# Patient Record
(demographics unavailable — no encounter records)

---

## 2024-12-09 NOTE — CONSULT LETTER
[Dear  ___] : Dear  [unfilled], [Consult Letter:] : I had the pleasure of evaluating your patient, [unfilled]. [Please see my note below.] : Please see my note below. [Consult Closing:] : Thank you very much for allowing me to participate in the care of this patient.  If you have any questions, please do not hesitate to contact me. [Sincerely,] : Sincerely, [DrSukhdeep  ___] : Dr. BHATT [DrSukhdeep ___] : Dr. BHATT [FreeTextEntry3] :  Robbin Gaspar M.D., FACS, [facility] Director [facility] at [facility] Certified in Thyroid/Parathyroid/Neck Ultrasound, RE/ VÍCTOR , Department of Otolaryngology [facility] at [facility]

## 2024-12-09 NOTE — HISTORY OF PRESENT ILLNESS
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  The left lobe contained a lower pole isoechoic nodule with microcalcifications measuring 1.4 x 1.1 x 1.3 cm TI-RADS TR 4.  Within the left lower pole of the left lobe there is a hypoechoic nodule with microcalcifications measuring 7 x 7 x 6 mm TI-RADS TR 5.  The larger nodule was then biopsied because of a family history of thyroid cancer (her mother had papillary thyroid cancer).  The cytology was reported as atypia of undetermined significance [place] category III, AUS with nuclear atypia.  However, on the ThyroSeq molecular panel a BRAF V600E mutation was identified with a 99% risk that this represents a papillary thyroid carcinoma and surgical consultation recommended.   Megan denies recent shortness of breath, voice changes, dysphagia, anterior neck pain, neck pressure or mass. She denies any known radiation exposures in her youth. She is euthyroid.

## 2024-12-09 NOTE — REASON FOR VISIT
[FreeTextEntry2] : a surgical consultation concerning multiple thyroid nodules and FH of thyroid cancer. [FreeTextEntry1] : Referred by Endocrinologis Cathie Stratton MD  PCP is Jeny Stoner MD

## 2024-12-09 NOTE — CONSULT LETTER
[Dear  ___] : Dear  [unfilled], [Consult Letter:] : I had the pleasure of evaluating your patient, [unfilled]. [Please see my note below.] : Please see my note below. [Consult Closing:] : Thank you very much for allowing me to participate in the care of this patient.  If you have any questions, please do not hesitate to contact me. [Sincerely,] : Sincerely, [DrSukhdeep  ___] : Dr. BHATT [DrSukhdeep ___] : Dr. BHATT [FreeTextEntry3] :  Robbin Gaspar M.D., FACS, [facility] Director [facility] at [facility] Certified in Thyroid/Parathyroid/Neck Ultrasound, RE/ VÍCTOR , Department of Otolaryngology [facility] at [facility]

## 2024-12-09 NOTE — DATA REVIEWED
[de-identified] : see HPI  [de-identified] : see HPI  [de-identified] : Cytology report as well as molecular test results reviewed

## 2024-12-09 NOTE — PHYSICAL EXAM
[TextEntry] : Focused Head and Neck Examination:  General Appearance: The patient has a normal appearance (head and face), able to communicate with normal speech and is a well-groomed, well-developed, well-nourished, thin fit female in no apparent distress.  Breathing was silent and not labored. The patient was alert had normal affect and oriented to person, place, and time. The patient had normal facial and neck skin with normal facial symmetry, strength and motion, no visible/ palpable facial masses or sinus tenderness.  Otologic Exam: The pinnae and bilateral external ear canals were without lesions and clear.  The tympanic membranes were normal bilaterally without perforation and demonstrated normal mobility. There was no evidence of middle ear effusion or infection. Hearing is grossly normal to finger rub.   External Nasal Exam: The external nose was normal in appearance without lesions or deformity.    Intranasal Exam: There were no mucosal lesions, discolorations, nasal polyps, or masses. The nasal septum was intact without perforations. The nasal septum was deviated slightly to the left with a spur. The inferior turbinates were normal. The middle turbinates were normal bilaterally.  The middle meatus was clear, and the secretions were thick but not purulent.  Nasopharynx: There were no visible masses, mucosal ulcerations, or other lesions.  Secretions were thick but not purulent.  Oral cavity: The patient's lips and vermillion border were normal without lesions, ulcerations or discolorations. Dentition was normal, the gums were normal, the oral mucosa was normal and there were no lesions, discolorations, ulcerations, erythema, or leukoplakia.  The floor of mouth was without lesions or palpable calculi. The oral tongue has normal mobility, without palpable or visible lesions, ulcerations, nodularity or tenderness.  All surfaces including lateral, ventral and dorsal mucosa examined and palpated. Expressed salivary flow was normal.  Stensens ducts are without palpable calculi.    Oropharynx: The tongue base was without palpable lesions, the soft palate was normal without lesions, the hard palate was normal without lesions, ulcerations, nodularity or discolorations.  The palatine tonsils were normal, and the lingual tonsils were normal.  Pharynx: The lateral and posterior pharyngeal walls were intact without mucosal lesions, discolorations, ulcerations, or masses.    Larynx and Hypopharynx: Flexible fiber optic laryngoscopy was performed with topical anesthesia using 4% lidocaine and 0.5 % Oxymetazoline on cotton pledgets. More details of the exam are outlined in my procedure note but this examination revealed normal, symmetric vocal fold mobility and normal mucosa without vocal fold lesions, discolorations, or ulcerations.  There was slight posterior pachydermia. The epiglottis and false vocal folds were normal without mucosal lesions. The piriform sinuses opened well and there were no lesions or pooling of saliva. The trachea was clear without narrowing in the immediate subglottic area and in midline position without lesions.    Neck Exam: There was no palpable lymphadenopathy or bruits in the central or lateral davis drainage basins levels I-VI.  There was no asymmetry, pulsatile masses or nodules. The parotid and submandibular glands were not enlarged or tender and without palpable nodules or mass.  The temporomandibular joints were normal bilaterally without deviation/subluxation/click/tenderness or crepitus to palpation.    Thyroid Examination: Thyroid gland was not enlarged nontender and with a large palpable nodule in the left mid to lower lobe that is mobile.  Neurological Exam: Cranial nerves II through XII were intact.  There was no visible tremor. Gross motor and sensory functions are normal.  A Chvostek sign was negative.  Ocular Exam: Pupils were equal and responsive to light.  Extraocular movements and gaze were normal. The sclera and conjunctiva were normal and anicteric.  Nystagmus was absent. No sign of erythema, ulcerations or lesions.    Respiratory: Respiratory effort is normal with symmetric chest expansion and no retractions or use of accessory muscles.    Cardiovascular: Extremities are normal with strong pulses, normal temperature, and no edema.

## 2024-12-09 NOTE — REASON FOR VISIT
[FreeTextEntry2] : a surgical consultation concerning multiple thyroid nodules and FH of thyroid cancer. [FreeTextEntry1] : Referred by Endocrinologis Cathei Stratton MD  PCP is Jeny Stoner MD

## 2024-12-09 NOTE — PROCEDURE
[Image(s) Captured] : image(s) captured and filed [Unable to Cooperate with Mirror] : patient unable to cooperate with mirror [Gag Reflex] : gag reflex preventing mirror examination [Topical Lidocaine] : topical lidocaine [Oxymetazoline HCl] : oxymetazoline HCl [Flexible Endoscope] : examined with the flexible endoscope [Serial Number: ___] : Serial Number: [unfilled] [FreeTextEntry3] : [facility] THYROID/[facility]ECK ULTRASOUND REPORT  NAME: WILSON CONNELL..           MR# [id number].....	              : 1989.....	         DATE: 2024.  HISTORY/ INDICATIONS: 34-year-old female with 2 solid nodules within the left thyroid lobe, the larger nodule is suspicious for papillary thyroid carcinoma based on molecular testing positive for a BRAF V600 E mutation.  This current exam is for lymph node mapping and treatment planning.  COMPARISON: None.  PROCEDURE: Physician performed high-resolution ultrasound gray scale imaging and color Doppler supplementation of the thyroid gland and neck was obtained in the longitudinal and transverse planes using a 13 MHz linear transducer with image capture.  All measurements are in centimeters (longitudinal x AP x transverse).    FINDINGS: Overall, the thyroid gland is normal in size, heterogeneous in echotexture with normal vascularity on color Doppler flow.  There are 2 solid nodules within the left mid lower and lower pole region of the left lobe.  Both nodules are suspicious for papillary thyroid carcinoma.  There is no evidence of extrathyroidal extension or lymph node metastases.  RIGHT LOBE: Is not enlarged, heterogeneous, with normal vascularity on color Doppler and measures 4.58 x 1.13 x 1.80 cm.  NODULES: Within the mid to upper right lobe, a hypoechoic, heterogeneous, well-marginated, solid nodule with grade 1 vascularity is identified within echogenic colloid crystals and measures 4 mm in greatest dimension consistent with a colloid cyst.   ISTHMUS: Measures 0.26 cm in AP dimension and is heterogeneous in echotexture with normal vascularity.  No nodules are identified.  LEFT LOBE: Is not enlarged, heterogeneous, with normal vascularity on color Doppler and measures 5.20 x 1.33 x 2.19 cm. NODULES: There are 2 solid nodules within the left thyroid lobe.  #1: A left mid lower lobe isoechoic solid nodule with smooth margins and a hypoechoic halo, is wider than tall, heterogeneous in echotexture with numerous punctate echogenic foci that measures 1.63 x 1.12 x 1.51 cm, TI-RADS TR 4.  #2.  Within the left lower lobe there is a solid hypoechoic nodule with smooth margins, that is wider than tall with numerous punctate echogenic foci and a grade 2 vascularity and measures 0.77 x 0.62 x 0.80 cm TI-RADS TR 5.    PARATHYROID GLANDS: There are no identified enlarged parathyroid glands in the central neck compartment.   LYMPH NODES: Bilateral neck levels I - VI were examined.  There are several benign appearing sub centimeter lymph nodes identified at neck levels II- III bilaterally (lateral neck), all with echogenic hilar lines, no calcifications or cystic degeneration and have a short, long axis ratio < 0.5 in the transverse plane.  There are no enlarged or abnormal appearing central compartment, level VI lymph nodes.  IMPRESSION: A 34-year-old female with a multinodular thyroid gland with 2 dominant nodules within the left thyroid lobe both suspicious for papillary thyroid carcinoma.  There is no evidence of extrathyroidal extension or lymph node metastases.  There is a tiny colloid cyst in the right mid to upper lobe posteriorly.    RECOMMENDATIONS: If active surveillance is chosen over thyroidectomy, repeat thyroid ultrasound 6 months as well as continued monitoring of TSH.  A CT scan of the neck is a consideration to assess possible additional lymph node involvement or extrathyroidal extension of tumor not seen on ultrasound.  Electronically signed by referring, interpreting and reporting physician Robbin Gaspar MD on 2024, 3:20 PM.  [facility] [address] [place] [address], [address], [place] [address] [phone] (voice), [phone] (fax). -------------------------------------------------------------------------------------------------------------------------------------------------------------------------------------    [de-identified] : Verbal informed consent was obtained from the patient.  Findings: The nasal septum is minimally deviated to the left with a spur.  There are no masses or polyps, and the nasal mucosa and secretions are normal. The choanae and posterior nasopharynx are normal without masses or drainage. The Eustachian tube orifices appear patent. The pharynx, including the posterior and lateral pharyngeal walls, the vallecula and base of tongue are normal without ulcerations, lesions or masses. The hypopharynx including the pyriform sinuses open well without pooling of secretions, mucosal lesions or masses. The supraglottic larynx including the epiglottis, petiole, arytenoids, glossoepiglottic, aryepiglottic and pharyngoepiglottic folds are normal without mucosal lesions, ulcerations or masses. The glottis reveals normal false vocal folds. The true vocal folds are glistening white, tense and of equal length, without paralysis, having symmetric mobility on adduction and abduction. There are no mucosal lesions, nodules, cysts, erythroplasia or leukoplakia. The posterior cricoid area has healthy pink mucosa in the interarytenoid area and esophageal inlet. There is [no] thickening/pachydermia of the interarytenoid mucosa suggestive of posterior laryngitis from laryngopharyngeal acid reflux disease. The trachea is clear without narrowing in the immediate subglottic region, without deviation or lesions. ------------------------------------------------------------------------------------------- [de-identified] : prop papillary thyroid carcinoma

## 2024-12-09 NOTE — HISTORY OF PRESENT ILLNESS
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  The left lobe contained a lower pole isoechoic nodule with microcalcifications measuring 1.4 x 1.1 x 1.3 cm TI-RADS TR 4.  Within the left lower pole of the left lobe there is a hypoechoic nodule with microcalcifications measuring 7 x 7 x 6 mm TI-RADS TR 5.  The larger nodule was then biopsied because of a family history of thyroid cancer (her mother had papillary thyroid cancer).  The cytology was reported as atypia of undetermined significance [place] category III, AUS with nuclear atypia.  However, on the ThyroSeq molecular panel a BRAF V600E mutation was identified with a 99% risk that this represents a papillary thyroid carcinoma and surgical consultation recommended.   Megan denies recent shortness of breath, voice changes, dysphagia, anterior neck pain, neck pressure or mass. She denies any known radiation exposures in her youth. She is euthyroid.

## 2024-12-09 NOTE — PROCEDURE
[Image(s) Captured] : image(s) captured and filed [Unable to Cooperate with Mirror] : patient unable to cooperate with mirror [Gag Reflex] : gag reflex preventing mirror examination [Topical Lidocaine] : topical lidocaine [Oxymetazoline HCl] : oxymetazoline HCl [Flexible Endoscope] : examined with the flexible endoscope [Serial Number: ___] : Serial Number: [unfilled] [FreeTextEntry3] : [facility] THYROID/[facility]ECK ULTRASOUND REPORT  NAME: WILSON CONNELL..           MR# [id number].....	              : 1989.....	         DATE: 2024.  HISTORY/ INDICATIONS: 34-year-old female with 2 solid nodules within the left thyroid lobe, the larger nodule is suspicious for papillary thyroid carcinoma based on molecular testing positive for a BRAF V600 E mutation.  This current exam is for lymph node mapping and treatment planning.  COMPARISON: None.  PROCEDURE: Physician performed high-resolution ultrasound gray scale imaging and color Doppler supplementation of the thyroid gland and neck was obtained in the longitudinal and transverse planes using a 13 MHz linear transducer with image capture.  All measurements are in centimeters (longitudinal x AP x transverse).    FINDINGS: Overall, the thyroid gland is normal in size, heterogeneous in echotexture with normal vascularity on color Doppler flow.  There are 2 solid nodules within the left mid lower and lower pole region of the left lobe.  Both nodules are suspicious for papillary thyroid carcinoma.  There is no evidence of extrathyroidal extension or lymph node metastases.  RIGHT LOBE: Is not enlarged, heterogeneous, with normal vascularity on color Doppler and measures 4.58 x 1.13 x 1.80 cm.  NODULES: Within the mid to upper right lobe, a hypoechoic, heterogeneous, well-marginated, solid nodule with grade 1 vascularity is identified within echogenic colloid crystals and measures 4 mm in greatest dimension consistent with a colloid cyst.   ISTHMUS: Measures 0.26 cm in AP dimension and is heterogeneous in echotexture with normal vascularity.  No nodules are identified.  LEFT LOBE: Is not enlarged, heterogeneous, with normal vascularity on color Doppler and measures 5.20 x 1.33 x 2.19 cm. NODULES: There are 2 solid nodules within the left thyroid lobe.  #1: A left mid lower lobe isoechoic solid nodule with smooth margins and a hypoechoic halo, is wider than tall, heterogeneous in echotexture with numerous punctate echogenic foci that measures 1.63 x 1.12 x 1.51 cm, TI-RADS TR 4.  #2.  Within the left lower lobe there is a solid hypoechoic nodule with smooth margins, that is wider than tall with numerous punctate echogenic foci and a grade 2 vascularity and measures 0.77 x 0.62 x 0.80 cm TI-RADS TR 5.    PARATHYROID GLANDS: There are no identified enlarged parathyroid glands in the central neck compartment.   LYMPH NODES: Bilateral neck levels I - VI were examined.  There are several benign appearing sub centimeter lymph nodes identified at neck levels II- III bilaterally (lateral neck), all with echogenic hilar lines, no calcifications or cystic degeneration and have a short, long axis ratio < 0.5 in the transverse plane.  There are no enlarged or abnormal appearing central compartment, level VI lymph nodes.  IMPRESSION: A 34-year-old female with a multinodular thyroid gland with 2 dominant nodules within the left thyroid lobe both suspicious for papillary thyroid carcinoma.  There is no evidence of extrathyroidal extension or lymph node metastases.  There is a tiny colloid cyst in the right mid to upper lobe posteriorly.    RECOMMENDATIONS: If active surveillance is chosen over thyroidectomy, repeat thyroid ultrasound 6 months as well as continued monitoring of TSH.  A CT scan of the neck is a consideration to assess possible additional lymph node involvement or extrathyroidal extension of tumor not seen on ultrasound.  Electronically signed by referring, interpreting and reporting physician Robbin Gaspar MD on 2024, 3:20 PM.  [facility] [address] [address], [address], [place] [address] [phone] (voice), [phone] (fax). -------------------------------------------------------------------------------------------------------------------------------------------------------------------------------------    [de-identified] : Verbal informed consent was obtained from the patient.  Findings: The nasal septum is minimally deviated to the left with a spur.  There are no masses or polyps, and the nasal mucosa and secretions are normal. The choanae and posterior nasopharynx are normal without masses or drainage. The Eustachian tube orifices appear patent. The pharynx, including the posterior and lateral pharyngeal walls, the vallecula and base of tongue are normal without ulcerations, lesions or masses. The hypopharynx including the pyriform sinuses open well without pooling of secretions, mucosal lesions or masses. The supraglottic larynx including the epiglottis, petiole, arytenoids, glossoepiglottic, aryepiglottic and pharyngoepiglottic folds are normal without mucosal lesions, ulcerations or masses. The glottis reveals normal false vocal folds. The true vocal folds are glistening white, tense and of equal length, without paralysis, having symmetric mobility on adduction and abduction. There are no mucosal lesions, nodules, cysts, erythroplasia or leukoplakia. The posterior cricoid area has healthy pink mucosa in the interarytenoid area and esophageal inlet. There is [no] thickening/pachydermia of the interarytenoid mucosa suggestive of posterior laryngitis from laryngopharyngeal acid reflux disease. The trachea is clear without narrowing in the immediate subglottic region, without deviation or lesions. ------------------------------------------------------------------------------------------- [de-identified] : prop papillary thyroid carcinoma

## 2024-12-09 NOTE — DATA REVIEWED
[de-identified] : see HPI  [de-identified] : see HPI  [de-identified] : Cytology report as well as molecular test results reviewed

## 2025-01-13 NOTE — CONSULT LETTER
[Dear  ___] : Dear  [unfilled], [Consult Letter:] : I had the pleasure of evaluating your patient, [unfilled]. [Please see my note below.] : Please see my note below. [Consult Closing:] : Thank you very much for allowing me to participate in the care of this patient.  If you have any questions, please do not hesitate to contact me. [Sincerely,] : Sincerely, [DrSukhdeep  ___] : Dr. BHATT [DrSukhdeep ___] : Dr. BHATT [FreeTextEntry3] :  Robbin Gaspar M.D., FACS, [facility] Director [facility] for [facility]athyroid Surgery at [facility] Certified in Thyroid/Parathyroid/Neck Ultrasound, RE/ VÍCTOR , Department of Otolaryngology [facility] at [facility]

## 2025-01-13 NOTE — PROCEDURE
[Image(s) Captured] : image(s) captured and filed [Unable to Cooperate with Mirror] : patient unable to cooperate with mirror [Gag Reflex] : gag reflex preventing mirror examination [Hoarseness] : hoarseness not clearly evaluated by indirect laryngoscopy [Topical Lidocaine] : topical lidocaine [Oxymetazoline HCl] : oxymetazoline HCl [Flexible Endoscope] : examined with the flexible endoscope [Serial Number: ___] : Serial Number: [unfilled] [de-identified] : Verbal informed consent was obtained from the patient.  Findings: The nasal septum is minimally deviated to the left with a spur.  There are no masses or polyps, and the nasal mucosa and secretions are normal. The choanae and posterior nasopharynx are normal without masses or drainage. The Eustachian tube orifices appear patent. The pharynx, including the posterior and lateral pharyngeal walls, the vallecula and base of tongue are normal without ulcerations, lesions or masses. The hypopharynx including the pyriform sinuses open well without pooling of secretions, mucosal lesions or masses. The supraglottic larynx including the epiglottis, petiole, arytenoids, glossoepiglottic, aryepiglottic and pharyngoepiglottic folds are normal without mucosal lesions, ulcerations or masses. The glottis reveals normal false vocal folds. The true vocal folds are hyperemic but tense and of equal length, without paralysis, having symmetric mobility on adduction and abduction. There are bilateral vocal process granulomas and no other mucosal lesions, nodules, cysts, erythroplasia or leukoplakia. The posterior cricoid area has healthy pink mucosa in the interarytenoid area and esophageal inlet. There is [no] thickening/pachydermia of the interarytenoid mucosa suggestive of posterior laryngitis from laryngopharyngeal acid reflux disease. The trachea is clear without narrowing in the immediate subglottic region, without deviation or lesions. ------------------------------------------------------------------------------------------- [de-identified] : status post hemithyroidectomy for a papillary thyroid carcinoma

## 2025-01-13 NOTE — PHYSICAL EXAM
[Normal] : no mass and no adenopathy [de-identified] : The neck is flat without seroma or hematoma. The subcuticular suture was removed. The voice is raspy.  A Chvostek sign was not present.

## 2025-01-13 NOTE — REASON FOR VISIT
[FreeTextEntry2] : a first po visit after left hemithyroidectomy and limited ipsilateral CCLND [FreeTextEntry1] : Referred by Endocrinologis Cathie Stratton MD  PCP is Jeny Stoner MD

## 2025-01-13 NOTE — HISTORY OF PRESENT ILLNESS
[de-identified] : Megan is a generally healthy 34-year-old female (worked in [employer] and now raising 3 children) who was noted by her primary care physician to have thyroid nodules on exam and was referred for a thyroid ultrasound this past August demonstrating a normal-size thyroid gland with no discrete solid nodules in the right lobe.  The left lobe contained a lower pole isoechoic nodule with microcalcifications measuring 1.4 x 1.1 x 1.3 cm TI-RADS TR 4.  Within the left lower pole of the left lobe there is a hypoechoic nodule with microcalcifications measuring 7 x 7 x 6 mm TI-RADS TR 5.  The larger nodule was then biopsied because of a family history of thyroid cancer (her mother had papillary thyroid cancer).  The cytology was reported as atypia of undetermined significance [place] category III, AUS with nuclear atypia.  However, on the ThyroSeq molecular panel a BRAF V600E mutation was identified with a 99% risk that this represents a papillary thyroid carcinoma and surgical consultation recommended.   Megan denies recent shortness of breath, voice changes, dysphagia, anterior neck pain, neck pressure or mass. She denies any known radiation exposures in her youth. She is euthyroid.  I have spent 60 min of time for the encounter exclusive of any procedures performed during the visit. -------------------------------------------------------------------------------------------------------------------------------------------------------------------------------------  [FreeTextEntry1] : Megan returns for a first post op visit after an uncomplicated subtotal thyroidectomy (left lobe/isthmus) on01/08/25.   This patient had a palpable approximately 2 cm nodule in the left lower lobe of the thyroid gland.  The left paratracheal space did not have any visible or palpable involved lymph nodes and there was no evidence of extrathyroidal extension of tumor.  Both the external branch of superior laryngeal nerve as well as the recurrent laryngeal nerves were functionally intact at the end of the surgery to nerve stimulation.  Both the superior and inferior parathyroid glands were preserved and viable at the end of the procedure.   The patient denies paresthesia and is not taking any calcium or vitamin D supplements.  She is tolerating a soft diet.  She complained of odynophagia.  Voice is still mildly hoarse. Surgical pathology is still pending.

## 2025-02-27 NOTE — CONSULT LETTER
[Dear  ___] : Dear  [unfilled], [Consult Letter:] : I had the pleasure of evaluating your patient, [unfilled]. [Please see my note below.] : Please see my note below. [Consult Closing:] : Thank you very much for allowing me to participate in the care of this patient.  If you have any questions, please do not hesitate to contact me. [Sincerely,] : Sincerely, [DrSukhdeep  ___] : Dr. BHATT [DrSukhdeep ___] : Dr. BHATT [FreeTextEntry3] :  Robbin Gaspar M.D., FACS, ECNU Director [facility] at [facility] Certified in Thyroid/Parathyroid/Neck Ultrasound, RE/ VÍCTOR , Department of Otolaryngology [facility] at [facility]

## 2025-02-27 NOTE — PHYSICAL EXAM
[Normal] : no mass and no adenopathy [de-identified] : The neck is flat without seroma or hematoma. The incision is showing signs of early hypertrophy and injected with 0.5 cc Kenalog 10 mg.

## 2025-02-27 NOTE — DATA REVIEWED
[No studies available for review at this time] : No studies available for review at this time [de-identified] : see HPI

## 2025-02-27 NOTE — REASON FOR VISIT
[FreeTextEntry2] : a 2nd po visit after left hemithyroidectomy and limited ipsilateral CCLND [FreeTextEntry1] : Referred by Endocrinologist Cathie Stratton MD  PCP is Jeny Stoner MD

## 2025-02-27 NOTE — HISTORY OF PRESENT ILLNESS
[de-identified] : Megan is a generally healthy 34-year-old female (worked in [employer] and now raising 3 children) who was noted by her primary care physician to have thyroid nodules on exam and was referred for a thyroid ultrasound this past August demonstrating a normal-size thyroid gland with no discrete solid nodules in the right lobe.  The left lobe contained a lower pole isoechoic nodule with microcalcifications measuring 1.4 x 1.1 x 1.3 cm TI-RADS TR 4.  Within the left lower pole of the left lobe there is a hypoechoic nodule with microcalcifications measuring 7 x 7 x 6 mm TI-RADS TR 5.  The larger nodule was then biopsied because of a family history of thyroid cancer (her mother had papillary thyroid cancer).  The cytology was reported as atypia of undetermined significance [place] category III, AUS with nuclear atypia.  However, on the ThyroSeq molecular panel a BRAF V600E mutation was identified with a 99% risk that this represents a papillary thyroid carcinoma and surgical consultation recommended.   Megan denies recent shortness of breath, voice changes, dysphagia, anterior neck pain, neck pressure or mass. She denies any known radiation exposures in her youth. She is euthyroid.  I have spent 60 min of time for the encounter exclusive of any procedures performed during the visit. -------------------------------------------------------------------------------------------------------------------------------------------------------------------------------------  [FreeTextEntry1] : Megan returns for a 2nd post op visit after an uncomplicated subtotal thyroidectomy (left lobe/isthmus) on01/08/25.   This patient had a palpable approximately 2 cm nodule in the left lower lobe of the thyroid gland.  The left paratracheal space did not have any visible or palpable involved lymph nodes and there was no evidence of extrathyroidal extension of tumor.  Both the external branch of superior laryngeal nerve as well as the recurrent laryngeal nerves were functionally intact at the end of the surgery to nerve stimulation.  Both the superior and inferior parathyroid glands were preserved and viable at the end of the procedure.   The patient denies paresthesia and is not taking any calcium or vitamin D supplements.  She is tolerating a regular diet.  She feels a globus sensation and complained of swelling just above the incision on the left.   Voice is less hoarse. Vocal fold mobility was normal post op on laryngoscopy.  Her TSH on February 25, 2025, was 5.33 with a free T4 of 1.31. Surgical path:Thyroid Gland - Resection Specimen Procedure:  Left lobectomy with isthmusectomy (hemithyroidectomy) Tumor Tumor Focality:   Multifocal Tumor Characteristics Tumor Site:  Left lobe;  Isthmus Tumor Size:  Greatest Dimension (Centimeters) 1.4 cm Histologic Tumor Types and Subtypes:    Papillary carcinoma, encapsulated classic subtype Tumor Proliferative Activity Mitotic Rate:  Less than 3 mitoses per 2mm2 Tumor Necrosis:   Not identified Angioinvasion (vascular invasion):    Not identified Lymphatic Invasion:   Not identified Extrathyroidal Extension:   Not identified Margin Status:   All margins negative for carcinoma Regional Lymph Nodes Regional Lymph Node Status:   All regional lymph nodes negative for tumor Number of Lymph Nodes Examined:   1 Alia Level(s) Examined:   Level VI pTNM Classification (AJCC 8th Edition) pT Category:  pT1b, AJCC Stage I   Tumor board consensus was to continue with active surveillance of the contralateral lobe and central compartment lymph nodes as radioactive iodine is not indicated in this low-risk patient.

## 2025-05-27 NOTE — REASON FOR VISIT
[FreeTextEntry2] : a f/u visit after left hemithyroidectomy and limited ipsilateral CCLND [FreeTextEntry1] : Referred by Endocrinologist Cathie Stratton MD  PCP is Jeny Stoner MD

## 2025-05-27 NOTE — HISTORY OF PRESENT ILLNESS
[de-identified] : Megan is a generally healthy 34-year-old female (worked in [employer] and now raising 3 children) who was noted by her primary care physician to have thyroid nodules on exam and was referred for a thyroid ultrasound this past August demonstrating a normal-size thyroid gland with no discrete solid nodules in the right lobe.  The left lobe contained a lower pole isoechoic nodule with microcalcifications measuring 1.4 x 1.1 x 1.3 cm TI-RADS TR 4.  Within the left lower pole of the left lobe there is a hypoechoic nodule with microcalcifications measuring 7 x 7 x 6 mm TI-RADS TR 5.  The larger nodule was then biopsied because of a family history of thyroid cancer (her mother had papillary thyroid cancer).  The cytology was reported as atypia of undetermined significance [place] category III, AUS with nuclear atypia.  However, on the ThyroSeq molecular panel a BRAF V600E mutation was identified with a 99% risk that this represents a papillary thyroid carcinoma and surgical consultation recommended.   Megan denies recent shortness of breath, voice changes, dysphagia, anterior neck pain, neck pressure or mass. She denies any known radiation exposures in her youth. She is euthyroid.  I have spent 60 min of time for the encounter exclusive of any procedures performed during the visit. -------------------------------------------------------------------------------------------------------------------------------------------------------------------------------------  [FreeTextEntry1] : Megan returns for a f/u visit after an uncomplicated subtotal thyroidectomy (left lobe/isthmus) on 01/08/25.   This patient had a palpable approximately 2 cm nodule in the left lower lobe of the thyroid gland.  The left paratracheal space did not have any visible or palpable involved lymph nodes and there was no evidence of extrathyroidal extension of tumor.  Both the external branch of superior laryngeal nerve as well as the recurrent laryngeal nerves were functionally intact at the end of the surgery to nerve stimulation.  Both the superior and inferior parathyroid glands were preserved and viable at the end of the procedure.   The patient denies paresthesia and is not taking any calcium or vitamin D supplements.  She is tolerating a regular diet.  She feels a globus sensation and complained of swelling just above the incision on the left.   Voice is less hoarse. Vocal fold mobility was normal post op on laryngoscopy.  Her TSH on 5/19/ 2025, was 2.12 with a free T4 of 1.2. Surgical path: Thyroid Gland - Resection Specimen Procedure:  Left lobectomy with isthmusectomy (hemithyroidectomy) Tumor Tumor Focality:   Multifocal Tumor Characteristics Tumor Site:  Left lobe;  Isthmus Tumor Size:  Greatest Dimension (Centimeters) 1.4 cm Histologic Tumor Types and Subtypes:    Papillary carcinoma, encapsulated classic subtype Tumor Proliferative Activity Mitotic Rate:  Less than 3 mitoses per 2mm2 Tumor Necrosis:   Not identified Angioinvasion (vascular invasion):    Not identified Lymphatic Invasion:   Not identified Extrathyroidal Extension:   Not identified Margin Status:   All margins negative for carcinoma Regional Lymph Nodes Regional Lymph Node Status:   All regional lymph nodes negative for tumor Number of Lymph Nodes Examined:   1 Alia Level(s) Examined:   Level VI pTNM Classification (AJCC 8th Edition) pT Category:  pT1b, AJCC Stage I  Tumor board consensus was to continue with active surveillance of the contralateral lobe and central compartment lymph nodes as radioactive iodine is not indicated in this low-risk patient.

## 2025-05-27 NOTE — HISTORY OF PRESENT ILLNESS
[de-identified] : Megan is a generally healthy 34-year-old female (worked in [employer] and now raising 3 children) who was noted by her primary care physician to have thyroid nodules on exam and was referred for a thyroid ultrasound this past August demonstrating a normal-size thyroid gland with no discrete solid nodules in the right lobe.  The left lobe contained a lower pole isoechoic nodule with microcalcifications measuring 1.4 x 1.1 x 1.3 cm TI-RADS TR 4.  Within the left lower pole of the left lobe there is a hypoechoic nodule with microcalcifications measuring 7 x 7 x 6 mm TI-RADS TR 5.  The larger nodule was then biopsied because of a family history of thyroid cancer (her mother had papillary thyroid cancer).  The cytology was reported as atypia of undetermined significance [place] category III, AUS with nuclear atypia.  However, on the ThyroSeq molecular panel a BRAF V600E mutation was identified with a 99% risk that this represents a papillary thyroid carcinoma and surgical consultation recommended.   Megan denies recent shortness of breath, voice changes, dysphagia, anterior neck pain, neck pressure or mass. She denies any known radiation exposures in her youth. She is euthyroid.  I have spent 60 min of time for the encounter exclusive of any procedures performed during the visit. -------------------------------------------------------------------------------------------------------------------------------------------------------------------------------------  [FreeTextEntry1] : Megan returns for a f/u visit after an uncomplicated subtotal thyroidectomy (left lobe/isthmus) on 01/08/25.   This patient had a palpable approximately 2 cm nodule in the left lower lobe of the thyroid gland.  The left paratracheal space did not have any visible or palpable involved lymph nodes and there was no evidence of extrathyroidal extension of tumor.  Both the external branch of superior laryngeal nerve as well as the recurrent laryngeal nerves were functionally intact at the end of the surgery to nerve stimulation.  Both the superior and inferior parathyroid glands were preserved and viable at the end of the procedure.   The patient denies paresthesia and is not taking any calcium or vitamin D supplements.  She is tolerating a regular diet.  She feels a globus sensation and complained of swelling just above the incision on the left.   Voice is less hoarse. Vocal fold mobility was normal post op on laryngoscopy.  Her TSH on 5/19/ 2025, was 2.12 with a free T4 of 1.2. Surgical path: Thyroid Gland - Resection Specimen Procedure:  Left lobectomy with isthmusectomy (hemithyroidectomy) Tumor Tumor Focality:   Multifocal Tumor Characteristics Tumor Site:  Left lobe;  Isthmus Tumor Size:  Greatest Dimension (Centimeters) 1.4 cm Histologic Tumor Types and Subtypes:    Papillary carcinoma, encapsulated classic subtype Tumor Proliferative Activity Mitotic Rate:  Less than 3 mitoses per 2mm2 Tumor Necrosis:   Not identified Angioinvasion (vascular invasion):    Not identified Lymphatic Invasion:   Not identified Extrathyroidal Extension:   Not identified Margin Status:   All margins negative for carcinoma Regional Lymph Nodes Regional Lymph Node Status:   All regional lymph nodes negative for tumor Number of Lymph Nodes Examined:   1 Alia Level(s) Examined:   Level VI pTNM Classification (AJCC 8th Edition) pT Category:  pT1b, AJCC Stage I  Tumor board consensus was to continue with active surveillance of the contralateral lobe and central compartment lymph nodes as radioactive iodine is not indicated in this low-risk patient.

## 2025-05-27 NOTE — PROCEDURE
[Image(s) Captured] : image(s) captured and filed [Unable to Cooperate with Mirror] : patient unable to cooperate with mirror [Gag Reflex] : gag reflex preventing mirror examination [Topical Lidocaine] : topical lidocaine [Oxymetazoline HCl] : oxymetazoline HCl [Flexible Endoscope] : examined with the flexible endoscope [Serial Number: ___] : Serial Number: [unfilled] [FreeTextEntry3] : [facility] POST THYROIDECTOMY NECK ULTRASOUND REPORT  NAME: WILSON CONNELL.. MR# [id number].....  : 1989.....  DATE: 2025.  HISTORY/ INDICATIONS: 35-year-old female status post a left thyroid lobectomy and isthmusectomy for papillary thyroid carcinoma.  Under active surveillance for the contralateral lobe and lymph nodes.  COMPARISON: None.  PROCEDURE: Signing physician referred and performed high-resolution ultrasound gray scale imaging and color Doppler supplementation of the thyroid gland surgical bed and neck was obtained in the longitudinal and transverse planes using a 13 MHz linear transducer with image capture.  All measurements are in centimeters (longitudinal x AP x transverse).    FINDINGS: The left thyroid lobe and isthmus have been surgically removed and replaced by echogenic scar tissue.  The right thyroid lobe is normal without nodules.  There are no hypoechoic nodules, vascular lesions or calcifications in the central neck compartment.    RIGHT LOBE: Is minimally enlarged, heterogeneous, with normal vascularity on color Doppler and measures 5.80 x 1.36 x 1.89 cm. NODULES: A 4 mm colloid cyst is no longer identified.  There are no other solid nodules identified.  PARATHYROID GLANDS: There are no identified enlarged parathyroid glands in the central neck compartment.   LYMPH NODES: Bilateral neck levels I - VI were examined.  There are several benign appearing sub centimeter lymph nodes identified at neck levels II- III bilaterally (lateral neck), all with echogenic hilar lines, no calcifications or cystic degeneration and have a short, long axis ratio < 0.5 in the transverse plane.  There are no enlarged or abnormal appearing central compartment, level VI lymph nodes.  IMPRESSION: A 35-year-old female status post left thyroid lobectomy and isthmusectomy. There is no current evidence in the resected thyroidectomy bed for residual thyroid tissue, recurrent tumor, enlarged or morphologically abnormal suspicious cervical lymph nodes levels I - VI.    RECOMMENDATIONS: Repeat thyroid ultrasound in 6 months as well as continued monitoring of TSH and thyroglobulin levels.   Electronically signed by referring, interpreting and reporting physician: Robbin Gaspar MD on 2025, 11:36 AM.  [facility]: [address] [place] [address], [address] [place] [address] [phone] (voice), [phone] (fax). -------------------------------------------------------------------------------------------------------------------------------------------------------------------------------------   [de-identified] : Verbal informed consent was obtained from the patient.  Findings: The nasal septum is minimally deviated to the left with a spur.  There are no masses or polyps, and the nasal mucosa and secretions are normal. The choanae and posterior nasopharynx are normal without masses or drainage. The Eustachian tube orifices appear patent. The pharynx, including the posterior and lateral pharyngeal walls, the vallecula and base of tongue are normal without ulcerations, lesions or masses. The hypopharynx including the pyriform sinuses open well without pooling of secretions, mucosal lesions or masses. The supraglottic larynx including the epiglottis, petiole, arytenoids, glossoepiglottic, aryepiglottic and pharyngoepiglottic folds are normal without mucosal lesions, ulcerations or masses. The glottis reveals normal false vocal folds. The true vocal folds are glistening white, tense and of equal length, without paralysis, having symmetric mobility on adduction and abduction. There are no mucosal lesions, nodules, cysts, erythroplasia or leukoplakia. The posterior cricoid area has healthy pink mucosa in the interarytenoid area and esophageal inlet. There is no thickening/pachydermia of the interarytenoid mucosa suggestive of posterior laryngitis from laryngopharyngeal acid reflux disease. The trachea is clear without narrowing in the immediate subglottic region, without deviation or lesions. ------------------------------------------------------------------------------------------- [de-identified] :  papillary thyroid carcinoma

## 2025-05-27 NOTE — DATA REVIEWED
[No studies available for review at this time] : No studies available for review at this time [de-identified] : see HPI

## 2025-05-27 NOTE — DATA REVIEWED
[No studies available for review at this time] : No studies available for review at this time [de-identified] : see HPI

## 2025-05-27 NOTE — CONSULT LETTER
[Dear  ___] : Dear  [unfilled], [Consult Letter:] : I had the pleasure of evaluating your patient, [unfilled]. [Please see my note below.] : Please see my note below. [Consult Closing:] : Thank you very much for allowing me to participate in the care of this patient.  If you have any questions, please do not hesitate to contact me. [Sincerely,] : Sincerely, [DrSukhdeep  ___] : Dr. BHATT [DrSukhdeep ___] : Dr. BHATT [FreeTextEntry3] :  Robbin Gaspar M.D., FACS, ECNU Director [facility]roid [facility] at [facility] Certified in Thyroid/Parathyroid/Neck Ultrasound, RE/ VÍCTOR , Department of Otolaryngology [facility] at [facility]

## 2025-05-27 NOTE — PHYSICAL EXAM
[TextEntry] : Focused Head and Neck Examination:  General Appearance: The patient has a normal appearance (head and face), able to communicate with normal speech and is a well-groomed, well-developed, well-nourished, thin fit female in no apparent distress.  Breathing was silent and not labored. The patient was alert had normal affect and oriented to person, place, and time. The patient had normal facial and neck skin with normal facial symmetry, strength and motion, no visible/ palpable facial masses or sinus tenderness.  Otologic Exam: The pinnae and bilateral external ear canals were without lesions and clear.  The tympanic membranes were normal bilaterally without perforation and demonstrated normal mobility. There was no evidence of middle ear effusion or infection. Hearing is grossly normal to finger rub.   External Nasal Exam: The external nose was normal in appearance without lesions or deformity.    Intranasal Exam: There were no mucosal lesions, discolorations, nasal polyps, or masses. The nasal septum was intact without perforations. The nasal septum was deviated slightly to the left with a spur. The inferior turbinates were normal. The middle turbinates were normal bilaterally.  The middle meatus was clear, and the secretions were thick but not purulent.  Nasopharynx: There were no visible masses, mucosal ulcerations, or other lesions.  Secretions were thick but not purulent.  Oral cavity: The patient's lips and vermillion border were normal without lesions, ulcerations or discolorations. Dentition was normal, the gums were normal, the oral mucosa was normal and there were no lesions, discolorations, ulcerations, erythema, or leukoplakia.  The floor of mouth was without lesions or palpable calculi. The oral tongue has normal mobility, without palpable or visible lesions, ulcerations, nodularity or tenderness.  All surfaces including lateral, ventral and dorsal mucosa examined and palpated. Expressed salivary flow was normal.  Stensens ducts are without palpable calculi.    Oropharynx: The tongue base was without palpable lesions, the soft palate was normal without lesions, the hard palate was normal without lesions, ulcerations, nodularity or discolorations.  The palatine tonsils were normal, and the lingual tonsils were normal.  Pharynx: The lateral and posterior pharyngeal walls were intact without mucosal lesions, discolorations, ulcerations, or masses.    Larynx and Hypopharynx: Flexible fiber optic laryngoscopy was performed with topical anesthesia using 4% lidocaine and 0.5 % Oxymetazoline on cotton pledgets. More details of the exam are outlined in my procedure note but this examination revealed normal, symmetric vocal fold mobility and normal mucosa without vocal fold lesions, discolorations, or ulcerations.  There was slight posterior pachydermia. The epiglottis and false vocal folds were normal without mucosal lesions. The piriform sinuses opened well and there were no lesions or pooling of saliva. The trachea was clear without narrowing in the immediate subglottic area and in midline position without lesions.    Neck Exam: There was no palpable lymphadenopathy or bruits in the central or lateral davis drainage basins levels I-VI.  There was no asymmetry, pulsatile masses or nodules. The parotid and submandibular glands were not enlarged or tender and without palpable nodules or mass.  The temporomandibular joints were normal bilaterally without deviation/subluxation/click/tenderness or crepitus to palpation.    Thyroid Examination: Thyroid gland surgical bed is without palpable nodularity masses or tenderness.  The thyroidectomy incision is healing nicely so far.   Neurological Exam: Cranial nerves II through XII were intact.  There was no visible tremor. Gross motor and sensory functions are normal.  A Chvostek sign was negative.  Ocular Exam: Pupils were equal and responsive to light.  Extraocular movements and gaze were normal. The sclera and conjunctiva were normal and anicteric.  Nystagmus was absent. No sign of erythema, ulcerations or lesions.    Respiratory: Respiratory effort is normal with symmetric chest expansion and no retractions or use of accessory muscles.    Cardiovascular: Extremities are normal with strong pulses, normal temperature, and no edema.

## 2025-05-27 NOTE — PROCEDURE
[Image(s) Captured] : image(s) captured and filed [Unable to Cooperate with Mirror] : patient unable to cooperate with mirror [Gag Reflex] : gag reflex preventing mirror examination [Topical Lidocaine] : topical lidocaine [Oxymetazoline HCl] : oxymetazoline HCl [Flexible Endoscope] : examined with the flexible endoscope [Serial Number: ___] : Serial Number: [unfilled] [FreeTextEntry3] : [facility] POST THYROIDECT[facility]Y NECK ULTRASOUND REPORT  NAME: WILSON CONNELL.. MR# [id number].....  : 1989.....  DATE: 2025.  HISTORY/ INDICATIONS: 35-year-old female status post a left thyroid lobectomy and isthmusectomy for papillary thyroid carcinoma.  Under active surveillance for the contralateral lobe and lymph nodes.  COMPARISON: None.  PROCEDURE: Signing physician referred and performed high-resolution ultrasound gray scale imaging and color Doppler supplementation of the thyroid gland surgical bed and neck was obtained in the longitudinal and transverse planes using a 13 MHz linear transducer with image capture.  All measurements are in centimeters (longitudinal x AP x transverse).    FINDINGS: The left thyroid lobe and isthmus have been surgically removed and replaced by echogenic scar tissue.  The right thyroid lobe is normal without nodules.  There are no hypoechoic nodules, vascular lesions or calcifications in the central neck compartment.    RIGHT LOBE: Is minimally enlarged, heterogeneous, with normal vascularity on color Doppler and measures 5.80 x 1.36 x 1.89 cm. NODULES: A 4 mm colloid cyst is no longer identified.  There are no other solid nodules identified.  PARATHYROID GLANDS: There are no identified enlarged parathyroid glands in the central neck compartment.   LYMPH NODES: Bilateral neck levels I - VI were examined.  There are several benign appearing sub centimeter lymph nodes identified at neck levels II- III bilaterally (lateral neck), all with echogenic hilar lines, no calcifications or cystic degeneration and have a short, long axis ratio < 0.5 in the transverse plane.  There are no enlarged or abnormal appearing central compartment, level VI lymph nodes.  IMPRESSION: A 35-year-old female status post left thyroid lobectomy and isthmusectomy. There is no current evidence in the resected thyroidectomy bed for residual thyroid tissue, recurrent tumor, enlarged or morphologically abnormal suspicious cervical lymph nodes levels I - VI.    RECOMMENDATIONS: Repeat thyroid ultrasound in 6 months as well as continued monitoring of TSH and thyroglobulin levels.   Electronically signed by referring, interpreting and reporting physician: Robbin Gaspar MD on 2025, 11:36 AM.  [facility]: [address] [place] [address], [address] [place] [address] [phone] (voice), [phone] (fax). -------------------------------------------------------------------------------------------------------------------------------------------------------------------------------------   [de-identified] : Verbal informed consent was obtained from the patient.  Findings: The nasal septum is minimally deviated to the left with a spur.  There are no masses or polyps, and the nasal mucosa and secretions are normal. The choanae and posterior nasopharynx are normal without masses or drainage. The Eustachian tube orifices appear patent. The pharynx, including the posterior and lateral pharyngeal walls, the vallecula and base of tongue are normal without ulcerations, lesions or masses. The hypopharynx including the pyriform sinuses open well without pooling of secretions, mucosal lesions or masses. The supraglottic larynx including the epiglottis, petiole, arytenoids, glossoepiglottic, aryepiglottic and pharyngoepiglottic folds are normal without mucosal lesions, ulcerations or masses. The glottis reveals normal false vocal folds. The true vocal folds are glistening white, tense and of equal length, without paralysis, having symmetric mobility on adduction and abduction. There are no mucosal lesions, nodules, cysts, erythroplasia or leukoplakia. The posterior cricoid area has healthy pink mucosa in the interarytenoid area and esophageal inlet. There is no thickening/pachydermia of the interarytenoid mucosa suggestive of posterior laryngitis from laryngopharyngeal acid reflux disease. The trachea is clear without narrowing in the immediate subglottic region, without deviation or lesions. ------------------------------------------------------------------------------------------- [de-identified] :  papillary thyroid carcinoma

## 2025-05-27 NOTE — CONSULT LETTER
[Dear  ___] : Dear  [unfilled], [Consult Letter:] : I had the pleasure of evaluating your patient, [unfilled]. [Please see my note below.] : Please see my note below. [Consult Closing:] : Thank you very much for allowing me to participate in the care of this patient.  If you have any questions, please do not hesitate to contact me. [Sincerely,] : Sincerely, [DrSukhdeep  ___] : Dr. BHATT [DrSukhdeep ___] : Dr. BHATT [FreeTextEntry3] :  Robbin Gaspar M.D., FACS, [facility] Director [facility] Certified in Thyroid/Parathyroid/Neck Ultrasound, RE/ VÍCTOR , Department of Otolaryngology [facility] [employer] [facility] at [facility]